# Patient Record
Sex: FEMALE | Race: BLACK OR AFRICAN AMERICAN | NOT HISPANIC OR LATINO | Employment: PART TIME | URBAN - METROPOLITAN AREA
[De-identification: names, ages, dates, MRNs, and addresses within clinical notes are randomized per-mention and may not be internally consistent; named-entity substitution may affect disease eponyms.]

---

## 2019-07-10 PROBLEM — M25.541 ARTHRALGIA OF BOTH HANDS: Status: ACTIVE | Noted: 2019-07-10

## 2019-07-10 PROBLEM — M25.542 ARTHRALGIA OF BOTH HANDS: Status: ACTIVE | Noted: 2019-07-10

## 2019-09-13 PROBLEM — S81.809A WOUND OF LOWER EXTREMITY: Status: ACTIVE | Noted: 2019-09-13

## 2019-09-13 PROBLEM — M25.541 ARTHRALGIA OF BOTH HANDS: Status: RESOLVED | Noted: 2019-07-10 | Resolved: 2019-09-13

## 2019-09-13 PROBLEM — M25.542 ARTHRALGIA OF BOTH HANDS: Status: RESOLVED | Noted: 2019-07-10 | Resolved: 2019-09-13

## 2019-09-13 PROBLEM — M62.830 BACK SPASM: Status: ACTIVE | Noted: 2019-09-13

## 2020-04-06 ENCOUNTER — NURSE TRIAGE (OUTPATIENT)
Dept: ADMINISTRATIVE | Facility: CLINIC | Age: 30
End: 2020-04-06

## 2020-04-06 NOTE — TELEPHONE ENCOUNTER
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 Was notified Friday 04/03. Last interaction with coworker was 7 days ago. Cannot return to work without negative test per employer; works at [employer].    Throwing up Thursday and Friday, but has not happened since. Has not been eating as much as usually.    No coughing, fever, chills, SOB, chest pain.    Hx of arthritis treated with hydroxychloroquine.    Reason for Disposition   [1] COVID-19 EXPOSURE (Close Contact) within last 14 days AND [2] NO cough, fever, or breathing difficulty    Additional Information   Negative: SEVERE difficulty breathing (e.g., struggling for each breath, speak in single words, bluish lips)   Negative: Sounds like a life-threatening emergency to the triager   Negative: [1] Adult has symptoms of COVID-19 (fever, cough, or SOB) AND [2] lab test positive   Negative: [1] Difficulty breathing (shortness of breath) occurs AND [2] onset > 14 days after COVID-19 EXPOSURE (Close Contact) AND [3] no major community spread   Negative: [1] Adult has symptoms of COVID-19 (fever, cough or SOB) AND [2] major community spread where patient lives AND [3] testing not being done for mild symptoms   Negative: [1] Dry cough occurs AND [2] onset > 14 days after COVID-19 EXPOSURE AND [3] no major community spread   Negative: [1] Wet cough (i.e., white-yellow, yellow, green, or pattie colored sputum) AND [2] onset > 14 days after COVID-19 EXPOSURE AND [3] no major community spread   Negative: [1] Common cold symptoms AND [2] onset > 14 days after COVID-19 EXPOSURE AND [3] no major community spread   Negative: [1] Difficulty breathing occurs AND [2] within 14 days of COVID-19 EXPOSURE (Close Contact)   Negative: Patient sounds very sick or weak to the triager   Negative: [1] Fever (or feeling feverish) OR cough occurs AND [2] living in area with major community spread AND [3] testing being done for symptoms   Negative: [1] Fever (or feeling feverish)  OR cough occurs AND [2] within 14 days of travel from a city or area with major community spread   Negative: [1] Fever (or feeling feverish) OR cough occurs AND [2] within 14 days of travel from another country (international travel)   Negative: [1] Fever (or feeling feverish) OR cough AND [2] within 14 Days of COVID-19 EXPOSURE (Close Contact)   Negative: [1] Mild body aches, chills, diarrhea, headache, runny nose, or sore throat AND [2] within 14 days of COVID-Exposure   Negative: [1] COVID-19 EXPOSURE within last 14 days AND [2] NO cough, fever, or breathing difficulty AND [3] exposed person is a healthcare worker who was NOT using all recommended personal protective equipment (i.e., a respirator-N95 mask, eye protection, gloves, and gown)    Protocols used: CORONAVIRUS (COVID-19) EXPOSURE-A-

## 2022-08-23 PROBLEM — R29.898 DECREASED GRIP STRENGTH: Status: ACTIVE | Noted: 2022-08-23
